# Patient Record
Sex: FEMALE | Race: WHITE
[De-identification: names, ages, dates, MRNs, and addresses within clinical notes are randomized per-mention and may not be internally consistent; named-entity substitution may affect disease eponyms.]

---

## 2022-07-02 ENCOUNTER — HOSPITAL ENCOUNTER (EMERGENCY)
Dept: HOSPITAL 76 - ED | Age: 32
Discharge: HOME | End: 2022-07-02
Payer: COMMERCIAL

## 2022-07-02 VITALS — SYSTOLIC BLOOD PRESSURE: 128 MMHG | DIASTOLIC BLOOD PRESSURE: 66 MMHG
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
## 2022-07-02 DIAGNOSIS — S63.91XA: ICD-10-CM

## 2022-07-02 DIAGNOSIS — S46.911A: Primary | ICD-10-CM

## 2022-07-02 PROCEDURE — 99284 EMERGENCY DEPT VISIT MOD MDM: CPT

## 2022-07-02 PROCEDURE — 99282 EMERGENCY DEPT VISIT SF MDM: CPT

## 2022-07-02 NOTE — XRAY REPORT
PROCEDURE:  Hand 3 View RT

 

INDICATIONS:  fall, R hand pain

 

TECHNIQUE:  3 views of the hand(s) acquired.  

 

COMPARISON:  None

 

FINDINGS:  

 

Bones:  No fractures or dislocations.  No suspicious bony lesions.  

 

Soft tissues:  No suspicious soft tissue calcifications.  

 

IMPRESSION:  

Normal right hand 

 

Reviewed by: Erwin Porter on 7/2/2022 10:57 AM [facility]

Approved by: Erwin Porter on 7/2/2022 10:57 AM [facility]

 

 

Station ID:  [id number]

## 2022-07-02 NOTE — ED PHYSICIAN DOCUMENTATION
History of Present Illness





- Stated complaint


Stated Complaint: RT SHOULDER INJ





- Chief complaint


Chief Complaint: Ext Problem





- History obtained from


History obtained from: Patient





- History of Present Illness


Timing: Yesterday


Pain level max: 4


Pain level now: 4





- Additonal information


Additional information: 





Patient is a 32-year-old female who presents to the emergency department after a

fall at home yesterday while she was packing her car to go on vacation.  She 

states that she tripped and fell on the stairs injuring her right hand and right

shoulder.  Has continued pain today so came in for evaluation.  No head, neck, 

back pain.  No fevers.  No chills.  No numbness or tingling.  Denies any 

possibility of pregnancy.  No loss of consciousness.  No head injury.





Review of Systems


Constitutional: denies: Fever, Chills


GI: denies: Nausea, Vomiting, Diarrhea


: denies: Now pregnant EGA


Skin: denies: Rash


Musculoskeletal: denies: Neck pain, Back pain


Neurologic: denies: Headache, Head injury





PD PAST MEDICAL HISTORY





- Past Medical History


Past Medical History: No





- Past Surgical History


Past Surgical History: No





- Present Medications


Home Medications: 


                                Ambulatory Orders











 Medication  Instructions  Recorded  Confirmed


 


PARoxetine HCl [Paxil]  07/02/22 














- Allergies


Allergies/Adverse Reactions: 


                                    Allergies











Allergy/AdvReac Type Severity Reaction Status Date / Time


 


lamotrigine [From Lamictal] Allergy  Unknown Verified 07/02/22 10:57


 


Penicillins Allergy  Rash Verified 07/02/22 10:57














PD ED PE NORMAL





- Vitals


Vital signs reviewed: Yes





- General


General: Alert and oriented X 3, No acute distress





- HEENT


HEENT: Atraumatic, PERRL, Moist mucous membranes





- Neck


Neck: Supple, no meningeal sign, No bony TTP, C-Spine cleared by NEXUS criteria





- Cardiac


Cardiac: RRR





- Respiratory


Respiratory: No respiratory distress, Clear bilaterally





- Derm


Derm: Warm and dry





- Extremities


Extremities: Other (Small abrasion to the right hand, near the fifth metacarpal.

 Mild tenderness.  No swelling or bruising.  Neurovascular intact.)





- Neuro


Neuro: Alert and oriented X 3





- Psych


Psych: Normal mood, Normal affect





- Free text exam


Free text exam: 





Right shoulder full range of motion, pain with internal rotation over the 

anterior aspect of the glenohumeral joint.  Pain with abduction, especially 

above 90 degrees.  Neurovascularly intact.  No deformity.





Results





- Vitals


Vitals: 


                               Vital Signs - 24 hr











  07/02/22 07/02/22





  10:49 12:12


 


Temperature 36.6 C 36.6 C


 


Heart Rate 69 71


 


Respiratory 16 





Rate  


 


Blood Pressure 125/79 128/66


 


O2 Saturation 99 99








                                     Oxygen











O2 Source                      Room air

















- Rads (name of study)


  ** Right shoulder x-ray


Radiology: Final report received, EMP read contemporaneously, See rad report





  ** Right hand x-ray


Radiology: Final report received, EMP read contemporaneously, See rad report





PD MEDICAL DECISION MAKING





- ED course


Complexity details: reviewed results, re-evaluated patient, considered 

differential, d/w patient


ED course: 





No acute findings on x-ray of the hand or shoulder.  Likely sprain.  Possible 

rotator cuff injury.  Declines pain medication here or for home.  Encouraged 

gentle stretching of the shoulder including wall walks.  Neurovascular intact 

including the axillary nerve.  We will have her follow-up with her doctor for 

further care.  Patient counseled regarding signs and symptoms for which IVETH humphries and urgent re-evaluation would be necessary. Patient with good 

understanding of and agreement to plan and is comfortable going home at this 

time





This document was made in part using voice recognition software. While efforts 

are made to proofread this document, sound alike and grammatical errors may 

occur.





Departure





- Departure


Disposition: 01 Home, Self Care


Clinical Impression: 


Right shoulder strain


Qualifiers:


 Encounter type: initial encounter Qualified Code(s): S46.911A - Strain of 

unspecified muscle, fascia and tendon at shoulder and upper arm level, right 

arm, initial encounter





Sprain of right hand


Qualifiers:


 Encounter type: initial encounter Qualified Code(s): S63.91XA - Sprain of 

unspecified part of right wrist and hand, initial encounter





Condition: Good


Instructions:  ED Contusion Hand, ED Sprain Shoulder


Follow-Up: 


Provider,Other [Primary Care Provider] - 


Comments: 


Your x-rays do not show any acute abnormalities today.  Please follow-up with 

your doctor as needed for further care.  You may have a rotator cuff injury, 

please continue to gently stretch the shoulder as I showed you today.  You can 

use Motrin or Tylenol as needed for pain.  Return if you worsen.


Discharge Date/Time: 07/02/22 12:12

## 2022-07-02 NOTE — XRAY REPORT
PROCEDURE:  Shoulder 3 View RT

 

INDICATIONS:  fall, R shoulder pain

 

TECHNIQUE:  Views of the location were acquired.  

 

COMPARISON:  None.

 

FINDINGS:  

 

Bones:  No fractures or dislocations.  No suspicious bony lesions.      

 

Soft tissues:  No suspicious soft tissue calcifications.  

     

 

IMPRESSION:  Normal right shoulder

 

Reviewed by: Erwin Porter on 7/2/2022 10:58 AM [facility]

Approved by: Erwin Porter on 7/2/2022 10:58 AM [facility]

 

 

Station ID:  [id number]